# Patient Record
Sex: FEMALE | Race: ASIAN | NOT HISPANIC OR LATINO | Employment: FULL TIME | URBAN - METROPOLITAN AREA
[De-identification: names, ages, dates, MRNs, and addresses within clinical notes are randomized per-mention and may not be internally consistent; named-entity substitution may affect disease eponyms.]

---

## 2024-04-15 PROBLEM — N63.11 MASS OF UPPER OUTER QUADRANT OF RIGHT BREAST: Status: ACTIVE | Noted: 2024-04-15

## 2024-04-15 NOTE — ASSESSMENT & PLAN NOTE
Her AOC appears benign. I have reviewed the imaging and I agree with a 6 month follow up ultrasound as recommended. She's comfortable being followed conservatively. She understands the importance of monthly self-breast exams and knows to notify me of any and all changes as they occur.

## 2024-04-15 NOTE — PROGRESS NOTES
[facility]  MD Flora Sal, NP-C      Date of Service: 4/16/2024    Chief Complaint:   Tesfaye Serra is a 39 y.o. female presenting today for a second opinion on her recent imaging. A benign appearing right breast mass was noted and close follow up has been recommended.     History of Present Illness:   Mrs. Tesfaye Serra presents on 04/16/2024 due to right breast mass that was seen on her recent imaging. The findings are thought to represents a mass like focus of fibrous tissue and close follow up has been recommended. . MD:::.Carson Davis III, MD    Past Medical History:   Diagnosis Date    Acquired hypothyroidism     Attention deficit disorder 6/1/2017 2:58:18 PM    [facility] Historical - Other: ADD (attention deficit disorder)-No Additional Notes    Mass of upper outer quadrant of right breast 04/15/2024      Past Surgical History:   Procedure Laterality Date    hemithyroidectomy          Current Outpatient Medications:     levothyroxine (SYNTHROID) 75 MCG tablet, TAKE 1 TABLET(75 MCG) BY MOUTH BEFORE BREAKFAST, Disp: 90 tablet, Rfl: 2    norethindrone-ethinyl estradiol-iron (MICROGESTIN FE 1.5/30, 28,) 1.5 mg-30 mcg (21)/75 mg (7) tablet, Take 1 tablet by mouth once daily., Disp: 90 tablet, Rfl: 3   Review of patient's allergies indicates:   Allergen Reactions    Benzalkonium chloride Other (See Comments) and Rash     blisters    Gluten protein Diarrhea, Nausea And Vomiting and Other (See Comments)    Neosporin (neomycin-polymyx) Blisters and Rash      Social History     Tobacco Use    Smoking status: Never    Smokeless tobacco: Never   Substance Use Topics    Alcohol use: Not on file      Family History   Problem Relation Name Age of Onset    Breast cancer Neg Hx      Ovarian cancer Neg Hx          Review of Systems   Integumentary:  Negative for color change, rash, mole/lesion, breast mass, breast discharge and breast tenderness.   Breast:  Negative for mass and tenderness       Physical Exam   Constitutional: She appears well-developed. She is cooperative.   HENT:   Head: Normocephalic.   Cardiovascular:  Normal rate and regular rhythm.            Pulmonary/Chest: She exhibits no tenderness and no bony tenderness. Right breast exhibits no mass, no nipple discharge, no skin change and no tenderness. Left breast exhibits no mass, no nipple discharge, no skin change and no tenderness.   Abdominal: Soft. Normal appearance.   Musculoskeletal: Lymphadenopathy:      Upper Body:      Right upper body: No supraclavicular or axillary adenopathy.      Left upper body: No supraclavicular or axillary adenopathy.     Neurological: She is alert.   Skin: No rash noted.        Mammogram: Screening 3/11/2024- There is a focal asymmetry seen in the upper outer quadrant of the right breast. There is no evidence of suspicious masses, calcifications, or other abnormal findings in the left breast. Impression: Right breast focal asymmetry at the upper outer position. Assessment: 0 - Incomplete. Ultrasound is recommended. There is no mammographic evidence of malignancy in the left breast.     Ultrasound: Right 3/19/2024- There is a masslike focus of fibrous tissue in the right breast at the 10 o'clock position 7 cm from the nipple that is wider than tall and corresponds to the mammographic finding. No shadowing or internal blood flow. Impression: Probably benign ridge of fibrous tissue in the right breast corresponding to the mammographic finding. Recommend six-month ultrasound follow-up.      1. Mass of upper outer quadrant of right breast  Assessment & Plan:  Her AOC appears benign. I have reviewed the imaging and I agree with a 6 month follow up ultrasound as recommended. She's comfortable being followed conservatively. She understands the importance of monthly self-breast exams and knows to notify me of any and all changes as they occur.       2. Abnormal mammogram  -      Ambulatory referral/consult to Breast Surgery         Medical Decision Making:  It is my impression that this patient suffers all conditions contained in this medical document.  Each of these conditions did affect our plan of care and my medical decision making today.  It is my opinion that the medical decision making concerning this patient was of moderate difficulty based on the aforementioned conditions.  Any further recommendations will be communicated to the patient by me.  I have reviewed and verified her allergies, list of medications, medical and surgical histories, social history, and a pertinent review of symptoms.      Follow up:  6 months and prn    For:   (LINDSEY florian right at [facility]

## 2024-04-16 ENCOUNTER — OFFICE VISIT (OUTPATIENT)
Dept: SURGERY | Facility: CLINIC | Age: 39
End: 2024-04-16
Payer: COMMERCIAL

## 2024-04-16 VITALS — WEIGHT: 113 LBS | BODY MASS INDEX: 19.29 KG/M2 | HEIGHT: 64 IN

## 2024-04-16 DIAGNOSIS — N63.11 MASS OF UPPER OUTER QUADRANT OF RIGHT BREAST: Primary | ICD-10-CM

## 2024-04-16 DIAGNOSIS — R92.8 ABNORMAL MAMMOGRAM: ICD-10-CM

## 2024-04-16 PROCEDURE — 99999 PR PBB SHADOW E&M-EST. PATIENT-LVL III: CPT | Mod: PBBFAC,,, | Performed by: NURSE PRACTITIONER

## 2024-04-16 PROCEDURE — 1160F RVW MEDS BY RX/DR IN RCRD: CPT | Mod: CPTII,S$GLB,, | Performed by: NURSE PRACTITIONER

## 2024-04-16 PROCEDURE — 3008F BODY MASS INDEX DOCD: CPT | Mod: CPTII,S$GLB,, | Performed by: NURSE PRACTITIONER

## 2024-04-16 PROCEDURE — 99203 OFFICE O/P NEW LOW 30 MIN: CPT | Mod: S$GLB,,, | Performed by: NURSE PRACTITIONER

## 2024-04-16 PROCEDURE — 1159F MED LIST DOCD IN RCRD: CPT | Mod: CPTII,S$GLB,, | Performed by: NURSE PRACTITIONER

## 2024-10-06 DIAGNOSIS — N63.11 MASS OF UPPER OUTER QUADRANT OF RIGHT BREAST: Primary | ICD-10-CM

## 2024-10-16 ENCOUNTER — OFFICE VISIT (OUTPATIENT)
Dept: SURGERY | Facility: CLINIC | Age: 39
End: 2024-10-16
Payer: COMMERCIAL

## 2024-10-16 DIAGNOSIS — N63.11 MASS OF UPPER OUTER QUADRANT OF RIGHT BREAST: Primary | ICD-10-CM

## 2024-10-16 PROCEDURE — 99213 OFFICE O/P EST LOW 20 MIN: CPT | Mod: S$GLB,,, | Performed by: NURSE PRACTITIONER

## 2024-10-16 PROCEDURE — 1160F RVW MEDS BY RX/DR IN RCRD: CPT | Mod: CPTII,S$GLB,, | Performed by: NURSE PRACTITIONER

## 2024-10-16 PROCEDURE — 1159F MED LIST DOCD IN RCRD: CPT | Mod: CPTII,S$GLB,, | Performed by: NURSE PRACTITIONER

## 2024-10-16 PROCEDURE — 3044F HG A1C LEVEL LT 7.0%: CPT | Mod: CPTII,S$GLB,, | Performed by: NURSE PRACTITIONER

## 2024-10-16 PROCEDURE — 99999 PR PBB SHADOW E&M-EST. PATIENT-LVL II: CPT | Mod: PBBFAC,,, | Performed by: NURSE PRACTITIONER

## 2024-10-16 NOTE — PROGRESS NOTES
[facility]  MD Flora Sal, NP-C    Date of Service: 10/16/2024      Chief Complaint:   Tesfaye Serra is a 39 y.o. female presenting today for  6 month follow up. She is due for Physical Examination and Ultrasound  She reports no interval changes on her self-breast examination.     History of Present Illness:   Mrs. Tesfaye Serra first presented on 04/16/2024 due to right breast mass that was seen on her recent imaging. The findings are thought to represents a mass like focus of fibrous tissue and close follow up has been recommended. . MD:::.Carson Davis III, MD     Past Medical History:   Diagnosis Date    Acquired hypothyroidism     Attention deficit disorder 6/1/2017 2:58:18 PM    [facility] Historical - Other: ADD (attention deficit disorder)-No Additional Notes    Mass of upper outer quadrant of right breast 04/15/2024      Past Surgical History:   Procedure Laterality Date    hemithyroidectomy          Current Outpatient Medications:     estradioL (ESTRACE) 0.01 % (0.1 mg/gram) vaginal cream, Place 1 g vaginally twice a week., Disp: 42.5 g, Rfl: 2    levothyroxine (SYNTHROID) 75 MCG tablet, TAKE 1 TABLET(75 MCG) BY MOUTH BEFORE BREAKFAST, Disp: 90 tablet, Rfl: 2    norethindrone-ethinyl estradiol-iron (MICROGESTIN FE 1.5/30, 28,) 1.5 mg-30 mcg (21)/75 mg (7) tablet, Take 1 tablet by mouth once daily., Disp: 90 tablet, Rfl: 3   Review of patient's allergies indicates:   Allergen Reactions    Benzalkonium chloride Other (See Comments) and Rash     blisters    Gluten protein Diarrhea, Nausea And Vomiting and Other (See Comments)    Neosporin (neomycin-polymyx) Blisters and Rash      Social History     Tobacco Use    Smoking status: Never    Smokeless tobacco: Never   Substance Use Topics    Alcohol use: Not on file      Family History   Problem Relation Name Age of Onset    Breast cancer Neg Hx      Ovarian cancer Neg Hx          Review of Systems    Integumentary:  Negative for color change, rash, mole/lesion, breast mass, breast discharge and breast tenderness.   Breast: Negative for mass and tenderness       Physical Exam   Constitutional: She is cooperative.   HENT:   Head: Normocephalic.   Pulmonary/Chest: She exhibits no mass. Right breast exhibits no inverted nipple, no mass, no nipple discharge, no skin change and no tenderness. Left breast exhibits no inverted nipple, no mass, no nipple discharge, no skin change and no tenderness.   Abdominal: Normal appearance.   Musculoskeletal: Lymphadenopathy:      Upper Body:      Right upper body: No supraclavicular or axillary adenopathy.      Left upper body: No supraclavicular or axillary adenopathy.     Neurological: She is alert.   Skin: No rash noted.        Ultrasound: Right- Stable ovoid probably benign-appearing nodule 10 o'clock position 7 cm from the nipple measuring 14 x 4 x 7 mm.       Assessment/Plan  1. Mass of upper outer quadrant of right breast  Assessment & Plan:  We reviewed our findings today and her questions were answered.  She understands that her imaging and exams have remained stable (and show nothing concerning).  She is comfortable being followed in a conservative fashion.      She understands the importance of monthly self-breast examination and knows to report any and all changes as they occur.    NOTE:::We viewed her films together at today's visit.  We discussed the multiple views obtained and the important findings.  Even benign changes were mentioned and her questions were answered.  She is to contact us if she has questions.               Medical Decision Making:  It is my impression that this patient suffers all conditions contained in this medical document.  Each of these conditions did affect our plan of care and my medical decision making today.  It is my opinion that the medical decision making concerning this patient was of moderate difficulty based on the aforementioned  conditions.  Any further recommendations will be communicated to the patient by me.  I have reviewed and verified her allergies, list of medications, medical and surgical histories, social history, and a pertinent review of symptoms.      Follow up:  6 months and prn    For:  Physical Examination, MGM (D) at [facility], and  (D) carmen florian right at [facility]

## 2025-02-27 ENCOUNTER — TELEPHONE (OUTPATIENT)
Dept: SURGERY | Facility: CLINIC | Age: 40
End: 2025-02-27
Payer: COMMERCIAL

## 2025-02-27 DIAGNOSIS — N63.11 MASS OF UPPER OUTER QUADRANT OF RIGHT BREAST: Primary | ICD-10-CM
